# Patient Record
Sex: MALE | Race: WHITE
[De-identification: names, ages, dates, MRNs, and addresses within clinical notes are randomized per-mention and may not be internally consistent; named-entity substitution may affect disease eponyms.]

---

## 2019-07-25 ENCOUNTER — HOSPITAL ENCOUNTER (INPATIENT)
Dept: HOSPITAL 84 - D.ER | Age: 64
LOS: 4 days | Discharge: HOME HEALTH | DRG: 494 | End: 2019-07-29
Attending: ORTHOPAEDIC SURGERY | Admitting: ORTHOPAEDIC SURGERY
Payer: COMMERCIAL

## 2019-07-25 VITALS — BODY MASS INDEX: 31.51 KG/M2 | WEIGHT: 245.52 LBS | HEIGHT: 74 IN
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
## 2019-07-25 DIAGNOSIS — S82.251B: Primary | ICD-10-CM

## 2019-07-25 DIAGNOSIS — I10: ICD-10-CM

## 2019-07-25 LAB
ALBUMIN SERPL-MCNC: 3.5 G/DL (ref 3.4–5)
ALP SERPL-CCNC: 52 U/L (ref 46–116)
ALT SERPL-CCNC: 29 U/L (ref 10–68)
ANION GAP SERPL CALC-SCNC: 11.8 MMOL/L (ref 8–16)
APTT BLD: 27.2 SECONDS (ref 22.8–39.4)
BASOPHILS NFR BLD AUTO: 0.4 % (ref 0–2)
BILIRUB SERPL-MCNC: 0.28 MG/DL (ref 0.2–1.3)
BUN SERPL-MCNC: 25 MG/DL (ref 7–18)
CALCIUM SERPL-MCNC: 8.5 MG/DL (ref 8.5–10.1)
CHLORIDE SERPL-SCNC: 106 MMOL/L (ref 98–107)
CO2 SERPL-SCNC: 28.3 MMOL/L (ref 21–32)
CREAT SERPL-MCNC: 1.2 MG/DL (ref 0.6–1.3)
EOSINOPHIL NFR BLD: 2.6 % (ref 0–7)
ERYTHROCYTE [DISTWIDTH] IN BLOOD BY AUTOMATED COUNT: 14.1 % (ref 11.5–14.5)
GLOBULIN SER-MCNC: 3.2 G/L
GLUCOSE SERPL-MCNC: 127 MG/DL (ref 74–106)
HCT VFR BLD CALC: 41.6 % (ref 42–54)
HGB BLD-MCNC: 14.3 G/DL (ref 13.5–17.5)
IMM GRANULOCYTES NFR BLD: 0.4 % (ref 0–5)
INR PPP: 1 (ref 0.85–1.17)
LYMPHOCYTES NFR BLD AUTO: 19.6 % (ref 15–50)
MCH RBC QN AUTO: 30.9 PG (ref 26–34)
MCHC RBC AUTO-ENTMCNC: 34.4 G/DL (ref 31–37)
MCV RBC: 89.8 FL (ref 80–100)
MONOCYTES NFR BLD: 8.2 % (ref 2–11)
NEUTROPHILS NFR BLD AUTO: 68.8 % (ref 40–80)
OSMOLALITY SERPL CALC.SUM OF ELEC: 288 MOSM/KG (ref 275–300)
PLATELET # BLD: 198 10X3/UL (ref 130–400)
PMV BLD AUTO: 8.8 FL (ref 7.4–10.4)
POTASSIUM SERPL-SCNC: 4.1 MMOL/L (ref 3.5–5.1)
PROT SERPL-MCNC: 6.7 G/DL (ref 6.4–8.2)
PROTHROMBIN TIME: 12.7 SECONDS (ref 11.6–15)
RBC # BLD AUTO: 4.63 10X6/UL (ref 4.2–6.1)
SODIUM SERPL-SCNC: 142 MMOL/L (ref 136–145)
WBC # BLD AUTO: 9 10X3/UL (ref 4.8–10.8)

## 2019-07-26 VITALS — SYSTOLIC BLOOD PRESSURE: 126 MMHG | DIASTOLIC BLOOD PRESSURE: 71 MMHG

## 2019-07-26 VITALS — DIASTOLIC BLOOD PRESSURE: 59 MMHG | SYSTOLIC BLOOD PRESSURE: 110 MMHG

## 2019-07-26 VITALS — DIASTOLIC BLOOD PRESSURE: 78 MMHG | SYSTOLIC BLOOD PRESSURE: 136 MMHG

## 2019-07-26 VITALS — DIASTOLIC BLOOD PRESSURE: 67 MMHG | SYSTOLIC BLOOD PRESSURE: 127 MMHG

## 2019-07-26 VITALS — SYSTOLIC BLOOD PRESSURE: 117 MMHG | DIASTOLIC BLOOD PRESSURE: 80 MMHG

## 2019-07-26 VITALS — SYSTOLIC BLOOD PRESSURE: 112 MMHG | DIASTOLIC BLOOD PRESSURE: 52 MMHG

## 2019-07-26 VITALS — SYSTOLIC BLOOD PRESSURE: 109 MMHG | DIASTOLIC BLOOD PRESSURE: 63 MMHG

## 2019-07-26 VITALS — DIASTOLIC BLOOD PRESSURE: 64 MMHG | SYSTOLIC BLOOD PRESSURE: 114 MMHG

## 2019-07-26 VITALS — SYSTOLIC BLOOD PRESSURE: 108 MMHG | DIASTOLIC BLOOD PRESSURE: 67 MMHG

## 2019-07-26 VITALS — DIASTOLIC BLOOD PRESSURE: 66 MMHG | SYSTOLIC BLOOD PRESSURE: 109 MMHG

## 2019-07-26 VITALS — SYSTOLIC BLOOD PRESSURE: 132 MMHG | DIASTOLIC BLOOD PRESSURE: 73 MMHG

## 2019-07-26 VITALS — SYSTOLIC BLOOD PRESSURE: 110 MMHG | DIASTOLIC BLOOD PRESSURE: 59 MMHG

## 2019-07-26 VITALS — SYSTOLIC BLOOD PRESSURE: 116 MMHG | DIASTOLIC BLOOD PRESSURE: 76 MMHG

## 2019-07-26 VITALS — SYSTOLIC BLOOD PRESSURE: 114 MMHG | DIASTOLIC BLOOD PRESSURE: 72 MMHG

## 2019-07-26 LAB
APPEARANCE UR: CLEAR
BILIRUB SERPL-MCNC: NEGATIVE MG/DL
COLOR UR: YELLOW
GLUCOSE SERPL-MCNC: NEGATIVE MG/DL
KETONES UR STRIP-MCNC: (no result) MG/DL
NITRITE UR-MCNC: NEGATIVE MG/ML
PH UR STRIP: 5 [PH] (ref 5–6)
PROT UR-MCNC: NEGATIVE MG/DL
SP GR UR STRIP: 1.01 (ref 1–1.02)
UROBILINOGEN UR-MCNC: NORMAL MG/DL

## 2019-07-26 PROCEDURE — 0QHG06Z INSERTION OF INTRAMEDULLARY INTERNAL FIXATION DEVICE INTO RIGHT TIBIA, OPEN APPROACH: ICD-10-PCS | Performed by: ORTHOPAEDIC SURGERY

## 2019-07-27 VITALS — DIASTOLIC BLOOD PRESSURE: 67 MMHG | SYSTOLIC BLOOD PRESSURE: 128 MMHG

## 2019-07-27 VITALS — SYSTOLIC BLOOD PRESSURE: 122 MMHG | DIASTOLIC BLOOD PRESSURE: 55 MMHG

## 2019-07-27 VITALS — DIASTOLIC BLOOD PRESSURE: 66 MMHG | SYSTOLIC BLOOD PRESSURE: 111 MMHG

## 2019-07-27 VITALS — SYSTOLIC BLOOD PRESSURE: 108 MMHG | DIASTOLIC BLOOD PRESSURE: 62 MMHG

## 2019-07-27 VITALS — DIASTOLIC BLOOD PRESSURE: 66 MMHG | SYSTOLIC BLOOD PRESSURE: 141 MMHG

## 2019-07-27 LAB
ANION GAP SERPL CALC-SCNC: 11.2 MMOL/L (ref 8–16)
BASOPHILS NFR BLD AUTO: 0.2 % (ref 0–2)
BUN SERPL-MCNC: 14 MG/DL (ref 7–18)
CALCIUM SERPL-MCNC: 7.5 MG/DL (ref 8.5–10.1)
CHLORIDE SERPL-SCNC: 105 MMOL/L (ref 98–107)
CO2 SERPL-SCNC: 26.8 MMOL/L (ref 21–32)
CREAT SERPL-MCNC: 1 MG/DL (ref 0.6–1.3)
EOSINOPHIL NFR BLD: 0.8 % (ref 0–7)
ERYTHROCYTE [DISTWIDTH] IN BLOOD BY AUTOMATED COUNT: 14.2 % (ref 11.5–14.5)
GLUCOSE SERPL-MCNC: 121 MG/DL (ref 74–106)
HCT VFR BLD CALC: 35.9 % (ref 42–54)
HGB BLD-MCNC: 12.1 G/DL (ref 13.5–17.5)
IMM GRANULOCYTES NFR BLD: 0.3 % (ref 0–5)
LYMPHOCYTES NFR BLD AUTO: 12.7 % (ref 15–50)
MCH RBC QN AUTO: 30.9 PG (ref 26–34)
MCHC RBC AUTO-ENTMCNC: 33.7 G/DL (ref 31–37)
MCV RBC: 91.6 FL (ref 80–100)
MONOCYTES NFR BLD: 11.8 % (ref 2–11)
NEUTROPHILS NFR BLD AUTO: 74.2 % (ref 40–80)
OSMOLALITY SERPL CALC.SUM OF ELEC: 279 MOSM/KG (ref 275–300)
PLATELET # BLD: 196 10X3/UL (ref 130–400)
PMV BLD AUTO: 9.3 FL (ref 7.4–10.4)
POTASSIUM SERPL-SCNC: 4 MMOL/L (ref 3.5–5.1)
RBC # BLD AUTO: 3.92 10X6/UL (ref 4.2–6.1)
SODIUM SERPL-SCNC: 139 MMOL/L (ref 136–145)
WBC # BLD AUTO: 10.5 10X3/UL (ref 4.8–10.8)

## 2019-07-28 VITALS — DIASTOLIC BLOOD PRESSURE: 68 MMHG | SYSTOLIC BLOOD PRESSURE: 112 MMHG

## 2019-07-28 VITALS — DIASTOLIC BLOOD PRESSURE: 69 MMHG | SYSTOLIC BLOOD PRESSURE: 129 MMHG

## 2019-07-28 VITALS — DIASTOLIC BLOOD PRESSURE: 67 MMHG | SYSTOLIC BLOOD PRESSURE: 126 MMHG

## 2019-07-28 VITALS — DIASTOLIC BLOOD PRESSURE: 64 MMHG | SYSTOLIC BLOOD PRESSURE: 107 MMHG

## 2019-07-28 VITALS — DIASTOLIC BLOOD PRESSURE: 59 MMHG | SYSTOLIC BLOOD PRESSURE: 130 MMHG

## 2019-07-28 VITALS — SYSTOLIC BLOOD PRESSURE: 118 MMHG | DIASTOLIC BLOOD PRESSURE: 65 MMHG

## 2019-07-28 LAB
BASOPHILS NFR BLD AUTO: 0.2 % (ref 0–2)
EOSINOPHIL NFR BLD: 2.3 % (ref 0–7)
ERYTHROCYTE [DISTWIDTH] IN BLOOD BY AUTOMATED COUNT: 13.9 % (ref 11.5–14.5)
HCT VFR BLD CALC: 35.4 % (ref 42–54)
HGB BLD-MCNC: 12.1 G/DL (ref 13.5–17.5)
IMM GRANULOCYTES NFR BLD: 0.3 % (ref 0–5)
LYMPHOCYTES NFR BLD AUTO: 18.6 % (ref 15–50)
MCH RBC QN AUTO: 30.7 PG (ref 26–34)
MCHC RBC AUTO-ENTMCNC: 34.2 G/DL (ref 31–37)
MCV RBC: 89.8 FL (ref 80–100)
MONOCYTES NFR BLD: 10.9 % (ref 2–11)
NEUTROPHILS NFR BLD AUTO: 67.7 % (ref 40–80)
PLATELET # BLD: 205 10X3/UL (ref 130–400)
PMV BLD AUTO: 9.4 FL (ref 7.4–10.4)
RBC # BLD AUTO: 3.94 10X6/UL (ref 4.2–6.1)
WBC # BLD AUTO: 8.8 10X3/UL (ref 4.8–10.8)

## 2019-07-29 VITALS — DIASTOLIC BLOOD PRESSURE: 69 MMHG | SYSTOLIC BLOOD PRESSURE: 135 MMHG

## 2019-07-29 VITALS — DIASTOLIC BLOOD PRESSURE: 67 MMHG | SYSTOLIC BLOOD PRESSURE: 132 MMHG

## 2019-07-29 VITALS — DIASTOLIC BLOOD PRESSURE: 71 MMHG | SYSTOLIC BLOOD PRESSURE: 140 MMHG

## 2019-07-29 VITALS — DIASTOLIC BLOOD PRESSURE: 74 MMHG | SYSTOLIC BLOOD PRESSURE: 136 MMHG

## 2019-07-29 LAB
BASOPHILS NFR BLD AUTO: 0.5 % (ref 0–2)
EOSINOPHIL NFR BLD: 2.6 % (ref 0–7)
ERYTHROCYTE [DISTWIDTH] IN BLOOD BY AUTOMATED COUNT: 13.9 % (ref 11.5–14.5)
HCT VFR BLD CALC: 36.4 % (ref 42–54)
HGB BLD-MCNC: 12.2 G/DL (ref 13.5–17.5)
IMM GRANULOCYTES NFR BLD: 0.4 % (ref 0–5)
LYMPHOCYTES NFR BLD AUTO: 19.7 % (ref 15–50)
MCH RBC QN AUTO: 30 PG (ref 26–34)
MCHC RBC AUTO-ENTMCNC: 33.5 G/DL (ref 31–37)
MCV RBC: 89.4 FL (ref 80–100)
MONOCYTES NFR BLD: 10.7 % (ref 2–11)
NEUTROPHILS NFR BLD AUTO: 66.1 % (ref 40–80)
PLATELET # BLD: 231 10X3/UL (ref 130–400)
PMV BLD AUTO: 9.3 FL (ref 7.4–10.4)
RBC # BLD AUTO: 4.07 10X6/UL (ref 4.2–6.1)
WBC # BLD AUTO: 8.5 10X3/UL (ref 4.8–10.8)

## 2019-07-29 NOTE — MORECARE
CASE MANAGEMENT DISCHARGE SUMMARY
 
 
PATIENT: EDUARDO PARKER                      UNIT: [id number]
ACCOUNT#: [id number]                       ADM DATE: 19
AGE: 63     : 55  SEX: M            ROOM/BED: D.2211    
AUTHOR: MARRY LIZARRAGA                             PHYSICIAN:                               
 
REFERRING PHYSICIAN: HYUN PARKINSON MD         
DATE OF SERVICE: 19
Discharge Plan
 
 
Patient Name: EDUARDO PARKER
Facility: [facility]
Encounter #: [id number]
Medical Record #: [id number]
: 1955
Planned Disposition: Home Health Service
Anticipated Discharge Date: 
 
Discharge Date: 
Expected LOS: 
Initial Reviewer: [email or web address]
Initial Review Date: 2019
Generated: 19  12:48 pm 
Comments
 
DCP- Discharge Planning
 
Updated by [email or web address]: Radhika Pepe on 19  10:44 am CT
Patient Name: EDUARDO PARKER                                     
Admission Status: ER   
Accout number: [id number]                              
Admission Date: 2019   
: 1955                                                        
Admission Diagnosis:DISPL COMMNT FX SHAFT OF R TIBIA, INIT FOR OPN FX TYPE   
Attending: HYUN PARKINSON                                                
Current LOS:  4   
  
Anticipated DC Date:    
Planned Disposition: Home Health Service   
Primary Insurance: [employer]   
  
  
Discharge Planning Comments:   
  
CM met with patient to complete initial dc planning assessment.  CM educated 
patient on the CM role and verbal consent given by patient to complete 
assessment.   Patient lives at home with his wife where he is independent 
 
with his care.  At discharge patient plans to return home and feels this is a 
safe discharge.  CM discussed availability of home health, rehab services, 
and medical equipment. His wife was buying him a walker and he will have home 
health, WAN with [employer] Patient denied known discharge needs at 
this time. Sandra (patient's wife ) will be driving the patient home today. CM 
will continue to follow and will assist as needed with dc plans/needs.    
  
  
  
  
: Rahdika Pepe
 DCPIA - Discharge Planning Initial Assessment
 
Updated by [email or web address]: Radhika Pepe on 19  11:43 am
*  Is the patient Alert and Oriented?
Yes
*  How many steps to enter\exit or inside your home?
 
*  PCP
[facility]
*  Pharmacy
[facility] [address]
*  Preadmission Environment
Home with Family
*  ADLs
Independent
*  Equipment
Rolling Walker
*  List name and contact numbers for known caregivers / representatives who 
currently or will assist patient after discharge:
MARSHA (WIFE) [phone]
*  Verbal permission to speak to the caregivers and representatives has been 
obtained from the patient.
N/A
*  Community resources currently utilized
None
*  Additional services required to return to the preadmission environment?
Yes
*  Can the patient safely return to the preadmission environment?
Yes
*  Has this patient been hospitalized within the prior 30 days at any 
hospital?
No
 
 
 
 
 
 
 
Last DP export: 19  10:41 a
Patient Name: EDUARDO PARKER
 
Encounter #: [id number]
Page [id number]
 
 
 
 
 
Electronically Signed by MARRY LIZARRAGA on 19 at 1148
 
 
 
 
 
 
**All edits/amendments must be made on the electronic document**
 
DICTATION DATE: 19     : LONG  19     
RPT#: [id number]                                DC DATE:        
                                               STATUS: ADM IN  
[facility]
 [place] [address]
***END OF REPORT***

## 2019-07-29 NOTE — MORECARE
CASE MANAGEMENT DISCHARGE SUMMARY
 
 
PATIENT: EDUARDO PARKER                      UNIT: [id number]
ACCOUNT#: [id number]                       ADM DATE: 19
AGE: 63     : 55  SEX: M            ROOM/BED: D.2211    
AUTHOR: MARRY LIZARRAGA                             PHYSICIAN:                               
 
REFERRING PHYSICIAN: HYUN PARKINSON MD         
DATE OF SERVICE: 19
Discharge Plan
 
 
Patient Name: EDUARDO PARKER
[facility]
Encounter #: [id number]
Medical Record #: [id number]
: 1955
Planned Disposition: Home Health Service
Anticipated Discharge Date: 
 
Discharge Date: 
Expected LOS: 
Initial Reviewer: [id number]
Initial Review Date: 2019
Generated: 19  12:41 pm 
  
 
 
 
 
 
 
 
Patient Name: EDUARDO PARKER
 
Encounter #: [id number]
Page [id number]
 
 
 
 
 
Electronically Signed by MARRY LIZARRAGA on 19 at 1141
 
 
 
 
 
 
**All edits/amendments must be made on the electronic document**
 
DICTATION DATE: 19 1141     : LONG  19 1141     
RPT#: [id number]                                DC DATE:        
                                               STATUS: ADM IN  
[facility]
[address] [place] [address]
***END OF REPORT***

## 2019-07-29 NOTE — MORECARE
CASE MANAGEMENT DISCHARGE SUMMARY
 
 
PATIENT: EDUARDO PARKER                      UNIT: [id number]
ACCOUNT#: [id number]                       ADM DATE: 19
AGE: 63     : 55  SEX: M            ROOM/BED: D.2211    
AUTHOR: MARRY LIZARRAGA                             PHYSICIAN:                               
 
REFERRING PHYSICIAN: HYUN PARKINSON MD         
DATE OF SERVICE: 19
Discharge Plan
 
 
Patient Name: EDUARDO PARKER
Facility: [facility]:[place]
Encounter #: [id number]
Medical Record #: [id number]
: 1955
Planned Disposition: Home Health Service
Anticipated Discharge Date: 
 
Discharge Date: 
Expected LOS: 
Initial Reviewer: [id number]
Initial Review Date: 2019
Generated: 19  12:55 pm 
Comments
 
DCP- Discharge Planning
 
Updated by [email or web address]: Radhika Pepe on 19  10:44 am CT
Patient Name: EDUARDO PARKER                                     
Admission Status: ER   
Accout number: [id number]                              
Admission Date: 2019   
: 1955                                                        
Admission Diagnosis:DISPL COMMNT FX SHAFT OF R TIBIA, INIT FOR OPN FX TYPE   
Attending: HYUN PARKINSON                                                
Current LOS:  4   
  
Anticipated DC Date:    
Planned Disposition: Home Health Service   
Primary Insurance: [employer]   
  
  
Discharge Planning Comments:   
  
CM met with patient to complete initial dc planning assessment.  CM educated 
patient on the CM role and verbal consent given by patient to complete 
assessment.   Patient lives at home with his wife where he is independent 
 
with his care.  At discharge patient plans to return home and feels this is a 
safe discharge.  CM discussed availability of home health, rehab services, 
and medical equipment. His wife was buying him a walker and he will have home 
health, MEGAN with [employer]. Patient denied known discharge needs at 
this time. Sandra (patient's wife ) will be driving the patient home today. CM 
will continue to follow and will assist as needed with dc plans/needs.    
  
  
  
  
: Radhika Pepe
 DCPIA - Discharge Planning Initial Assessment
 
Updated by [email or web address]: Radhika Pepe on 19  11:43 am
*  Is the patient Alert and Oriented?
Yes
*  How many steps to enter\exit or inside your home?
 
*  PCP
[facility]
*  Pharmacy
[facility] [address]
*  Preadmission Environment
Home with Family
*  ADLs
Independent
*  Equipment
Rolling Walker
*  List name and contact numbers for known caregivers / representatives who 
currently or will assist patient after discharge:
MARSHA (WIFE) [phone]
*  Verbal permission to speak to the caregivers and representatives has been 
obtained from the patient.
N/A
*  Community resources currently utilized
None
*  Additional services required to return to the preadmission environment?
Yes
*  Can the patient safely return to the preadmission environment?
Yes
*  Has this patient been hospitalized within the prior 30 days at any 
hospital?
No
 
External Providers
[facility]
 
Next Contact Date: 
Service Request Date: 
Service Type: 
Resolution: 
 
 
Reviewer: 
Comments: 
 
 
 
 
Coverage Notice
 
Reviewer: [email or web address] iNssa Pepe
 
Notice Issued Date-Time: 2019  11:30
Notice Type: Patient Choice Letter
 
Notice Delivered To: Patient
Relationship to Patient: 
Representative Name: 
 
Delivery Method: HAND - Hand Delivered
Gege Days:
Prior Verbal Notification: 
 
Recipient Understood Notice: Yes
Recipient Signature: Yes
Med Rec Note Co-signed by Attending:
 
Coverage Notice Comment:  [facility]
 
Last DP export: 19  10:48 a
Patient Name: EDUARDO PARKER
Encounter #: [id number]
Page [phone]
 
 
 
 
 
Electronically Signed by MARRY LIZARRAGA on 19 at 1155
 
 
 
 
 
 
**All edits/amendments must be made on the electronic document**
 
DICTATION DATE: 19 115     : LONG  19 1154     
RPT#: [id number]                                DC DATE:        
                                               STATUS: ADM IN  
[facility]
 [place] [address]
***END OF REPORT***

## 2019-08-06 ENCOUNTER — HOSPITAL ENCOUNTER (OUTPATIENT)
Dept: HOSPITAL 84 - D.LABREF | Age: 64
Discharge: HOME | End: 2019-08-06
Attending: ORTHOPAEDIC SURGERY
Payer: COMMERCIAL

## 2019-08-06 ENCOUNTER — HOSPITAL ENCOUNTER (INPATIENT)
Dept: HOSPITAL 84 - D.MS | Age: 64
LOS: 3 days | Discharge: HOME HEALTH | DRG: 581 | End: 2019-08-09
Attending: ORTHOPAEDIC SURGERY | Admitting: ORTHOPAEDIC SURGERY
Payer: COMMERCIAL

## 2019-08-06 VITALS — SYSTOLIC BLOOD PRESSURE: 105 MMHG | DIASTOLIC BLOOD PRESSURE: 49 MMHG

## 2019-08-06 VITALS
HEIGHT: 73 IN | BODY MASS INDEX: 31.81 KG/M2 | WEIGHT: 240 LBS | HEIGHT: 73 IN | WEIGHT: 240 LBS | BODY MASS INDEX: 31.81 KG/M2 | BODY MASS INDEX: 31.81 KG/M2

## 2019-08-06 VITALS — DIASTOLIC BLOOD PRESSURE: 72 MMHG | SYSTOLIC BLOOD PRESSURE: 132 MMHG

## 2019-08-06 VITALS — BODY MASS INDEX: 31.7 KG/M2

## 2019-08-06 DIAGNOSIS — S81.831A: Primary | ICD-10-CM

## 2019-08-06 DIAGNOSIS — K21.9: ICD-10-CM

## 2019-08-06 DIAGNOSIS — E66.9: ICD-10-CM

## 2019-08-06 DIAGNOSIS — S81.801A: ICD-10-CM

## 2019-08-06 DIAGNOSIS — I10: ICD-10-CM

## 2019-08-06 DIAGNOSIS — B96.89: ICD-10-CM

## 2019-08-06 DIAGNOSIS — S80.11XA: ICD-10-CM

## 2019-08-06 DIAGNOSIS — S81.801A: Primary | ICD-10-CM

## 2019-08-06 NOTE — NUR
RECEIVED PATIENT TO ROOM 2203 VIA WC FROM DR. PARKINSON OFFICE. A/O X3.
INCISION TO RIGHT KNEE IS CLEAN AND DRY. WOUND TO RIGHT LOWER LEG IS CLOSED
BUT HAS SOME CLIPS AND SUTURES IN PLACE. APPROXIMATLY 2 INCHES LONG. IV SITED
TO RIGHT FOREARM AFTER 2 ATTEMPTS WITH 22G. WIFE AT BEDSIDE. DENIES NEEDS OF
PAIN AT THIS TIME.

## 2019-08-07 VITALS — SYSTOLIC BLOOD PRESSURE: 109 MMHG | DIASTOLIC BLOOD PRESSURE: 54 MMHG

## 2019-08-07 VITALS — SYSTOLIC BLOOD PRESSURE: 126 MMHG | DIASTOLIC BLOOD PRESSURE: 67 MMHG

## 2019-08-07 VITALS — SYSTOLIC BLOOD PRESSURE: 122 MMHG | DIASTOLIC BLOOD PRESSURE: 62 MMHG

## 2019-08-07 VITALS — SYSTOLIC BLOOD PRESSURE: 150 MMHG | DIASTOLIC BLOOD PRESSURE: 69 MMHG

## 2019-08-07 VITALS — DIASTOLIC BLOOD PRESSURE: 58 MMHG | SYSTOLIC BLOOD PRESSURE: 103 MMHG

## 2019-08-07 VITALS — DIASTOLIC BLOOD PRESSURE: 69 MMHG | SYSTOLIC BLOOD PRESSURE: 150 MMHG

## 2019-08-07 LAB
ALBUMIN SERPL-MCNC: 3.2 G/DL (ref 3.4–5)
ALP SERPL-CCNC: 71 U/L (ref 46–116)
ALT SERPL-CCNC: 24 U/L (ref 10–68)
ANION GAP SERPL CALC-SCNC: 11.9 MMOL/L (ref 8–16)
BILIRUB SERPL-MCNC: 0.59 MG/DL (ref 0.2–1.3)
BUN SERPL-MCNC: 17 MG/DL (ref 7–18)
CALCIUM SERPL-MCNC: 8.3 MG/DL (ref 8.5–10.1)
CHLORIDE SERPL-SCNC: 104 MMOL/L (ref 98–107)
CO2 SERPL-SCNC: 27.2 MMOL/L (ref 21–32)
CREAT SERPL-MCNC: 0.9 MG/DL (ref 0.6–1.3)
GLOBULIN SER-MCNC: 2.7 G/L
GLUCOSE SERPL-MCNC: 88 MG/DL (ref 74–106)
OSMOLALITY SERPL CALC.SUM OF ELEC: 278 MOSM/KG (ref 275–300)
POTASSIUM SERPL-SCNC: 4.1 MMOL/L (ref 3.5–5.1)
PROT SERPL-MCNC: 5.9 G/DL (ref 6.4–8.2)
SODIUM SERPL-SCNC: 139 MMOL/L (ref 136–145)

## 2019-08-07 PROCEDURE — 0JDN0ZZ EXTRACTION OF RIGHT LOWER LEG SUBCUTANEOUS TISSUE AND FASCIA, OPEN APPROACH: ICD-10-PCS | Performed by: ORTHOPAEDIC SURGERY

## 2019-08-07 PROCEDURE — 0Y9H0ZZ DRAINAGE OF RIGHT LOWER LEG, OPEN APPROACH: ICD-10-PCS | Performed by: ORTHOPAEDIC SURGERY

## 2019-08-07 NOTE — NUR
PT RESTING IN BED WATCHING TV.  VOICES UPCOMING SURGICAL PROCEDURE AND BEING
NPO SINCE MIDNIGHT.  NO ACUTE DISTRESS NOTED.  DENIES PAIN AT THIS TIME.  IV
TO RIGHT FOREARM WITH 1/2 NS @ 30 ML/HR INFUSING VIA PUMP.  SITE WITHOUT
REDNESS OR EDEMA.  HEALING INCISION TO RIGHT KNEE, DRESSING C/D/I TO RIGHT
LOWER LEG.  DENIES FURTHER NEEDS AT THIS TIME.  CL WITHIN REACH.  ENCOURAGED
TO CALL WITH NEEDS.  CONTINUE POC

## 2019-08-07 NOTE — NUR
RECEIVED PT FROM OR VIA STRETCHER. ALERT AND ORIENTED X4. C/O PAIN IN IN RLE
AS 10 . MEDICATED WITH MORPHINE AS ORDERED. ACE WRAP TO RLE WITH HEMOVAC DRAIN
WITH BLOODY DRAINAGE. PEDAL PULSES GOOD BILAT. NO EDEMA NOTED. IV INFUSING IN
LT HAND WITHOUT DIFF. V/S STABLE. SR ELEVATED X2. WIFE AT BEDSIDE. CL IN
REACH.

## 2019-08-08 VITALS — DIASTOLIC BLOOD PRESSURE: 54 MMHG | SYSTOLIC BLOOD PRESSURE: 108 MMHG

## 2019-08-08 VITALS — DIASTOLIC BLOOD PRESSURE: 66 MMHG | SYSTOLIC BLOOD PRESSURE: 136 MMHG

## 2019-08-08 VITALS — SYSTOLIC BLOOD PRESSURE: 95 MMHG | DIASTOLIC BLOOD PRESSURE: 43 MMHG

## 2019-08-08 VITALS — SYSTOLIC BLOOD PRESSURE: 127 MMHG | DIASTOLIC BLOOD PRESSURE: 82 MMHG

## 2019-08-08 VITALS — SYSTOLIC BLOOD PRESSURE: 114 MMHG | DIASTOLIC BLOOD PRESSURE: 56 MMHG

## 2019-08-08 VITALS — DIASTOLIC BLOOD PRESSURE: 56 MMHG | SYSTOLIC BLOOD PRESSURE: 119 MMHG

## 2019-08-08 LAB
ALBUMIN SERPL-MCNC: 2.8 G/DL (ref 3.4–5)
ALP SERPL-CCNC: 66 U/L (ref 46–116)
ALT SERPL-CCNC: 22 U/L (ref 10–68)
ANION GAP SERPL CALC-SCNC: 12.6 MMOL/L (ref 8–16)
BASOPHILS NFR BLD AUTO: 0.3 % (ref 0–2)
BILIRUB SERPL-MCNC: 0.36 MG/DL (ref 0.2–1.3)
BUN SERPL-MCNC: 20 MG/DL (ref 7–18)
CALCIUM SERPL-MCNC: 8.3 MG/DL (ref 8.5–10.1)
CHLORIDE SERPL-SCNC: 105 MMOL/L (ref 98–107)
CO2 SERPL-SCNC: 26.3 MMOL/L (ref 21–32)
CREAT SERPL-MCNC: 1.1 MG/DL (ref 0.6–1.3)
EOSINOPHIL NFR BLD: 0 % (ref 0–7)
ERYTHROCYTE [DISTWIDTH] IN BLOOD BY AUTOMATED COUNT: 13.4 % (ref 11.5–14.5)
GLOBULIN SER-MCNC: 3.2 G/L
GLUCOSE SERPL-MCNC: 118 MG/DL (ref 74–106)
HCT VFR BLD CALC: 36.4 % (ref 42–54)
HGB BLD-MCNC: 12.2 G/DL (ref 13.5–17.5)
IMM GRANULOCYTES NFR BLD: 0.5 % (ref 0–5)
LYMPHOCYTES NFR BLD AUTO: 13.2 % (ref 15–50)
MCH RBC QN AUTO: 30 PG (ref 26–34)
MCHC RBC AUTO-ENTMCNC: 33.5 G/DL (ref 31–37)
MCV RBC: 89.7 FL (ref 80–100)
MONOCYTES NFR BLD: 5.8 % (ref 2–11)
NEUTROPHILS NFR BLD AUTO: 80.2 % (ref 40–80)
OSMOLALITY SERPL CALC.SUM OF ELEC: 281 MOSM/KG (ref 275–300)
PLATELET # BLD: 297 10X3/UL (ref 130–400)
PMV BLD AUTO: 8.2 FL (ref 7.4–10.4)
POTASSIUM SERPL-SCNC: 4.9 MMOL/L (ref 3.5–5.1)
PROT SERPL-MCNC: 6 G/DL (ref 6.4–8.2)
RBC # BLD AUTO: 4.06 10X6/UL (ref 4.2–6.1)
SODIUM SERPL-SCNC: 139 MMOL/L (ref 136–145)
WBC # BLD AUTO: 7.7 10X3/UL (ref 4.8–10.8)

## 2019-08-08 NOTE — MORECARE
CASE MANAGEMENT DISCHARGE SUMMARY
 
 
PATIENT: EDUARDO PARKER                      UNIT: [id number]
ACCOUNT#: [id number]                       ADM DATE: 19
AGE: 63     : 55  SEX: M            ROOM/BED: D.2203    
AUTHOR: MARRY LIZARRAGA                             PHYSICIAN:                               
 
REFERRING PHYSICIAN: HYUN PARKINSON MD         
DATE OF SERVICE: 19
Discharge Plan
 
 
Patient Name: EDUARDO PARKER
Facility: [facility]:[place]
Encounter #: [id number]
Medical Record #: [id number]
: 1955
Planned Disposition: Home Health Service
Anticipated Discharge Date: 
 
Discharge Date: 
Expected LOS: 
Initial Reviewer: [id number]
Initial Review Date: 2019
Generated: 19   4:02 pm 
Comments
 
DCP- Discharge Planning
 
Updated by [email or web address]: Radhika Pepe on 19   1:55 pm CT
Patient Name: EDUARDO PARKER                                     
Admission Status: Urgent   
Accout number: [id number]                              
Admission Date: 2019   
: 1955                                                        
Admission Diagnosis:   
Attending: HYUN PARKINSON                                                
Current LOS:  2   
  
Anticipated DC Date:    
Planned Disposition: Home Health Service   
Primary Insurance: [employer] POS   
  
  
Discharge Planning Comments:   
  
CM met with patient to complete initial dc planning assessment.  CM educated 
patient on the CM role and verbal consent given by patient to complete 
assessment.   Patient lives at home with his wife where he was independent 
 
with his care.   At discharge patient plans to return home and feels this is 
a safe discharge.  CM discussed availability of home health, rehab services, 
and medical equipment. He is current with [facility]  ( MEGAN signed and 
in chart) He will need iv abx for 3 weeks. He does not care what company to 
use, just needs to take his insurance and the cheapest. Patient has a walker 
at home. He would like either crutches or a wheelchair, because he drags his 
foot. Will speak with MD to see what he wants him to have.  CM will continue 
to follow and will assist as needed with dc plans/needs.    
  
  
  
  
: Radhika Pepe
 DCPIA - Discharge Planning Initial Assessment
 
Updated by [id number]: Radhika Pepe on 19   2:51 pm
*  Is the patient Alert and Oriented?
Yes
*  How many steps to enter\exit or inside your home?
 
*  PCP
[facility]
*  [facility]
*  Preadmission Environment
Home with Family
*  ADLs
Independent
*  Equipment
Rolling Walker
*  List name and contact numbers for known caregivers / representatives who 
currently or will assist patient after discharge:
Bing Parker [phone]
*  Verbal permission to speak to the caregivers and representatives has been 
obtained from the patient.
Yes
*  Community resources currently utilized
Home Health
*  Please name any agencies selected above.
[facility]
*  Additional services required to return to the preadmission environment?
Yes
*  Can the patient safely return to the preadmission environment?
Yes
*  Has this patient been hospitalized within the prior 30 days at any 
hospital?
Yes
 
 
 
 
 
 
Coverage Notice
 
Reviewer: [email or web address] iNssa Pepe
 
Notice Issued Date-Time: 2019  14:20
Notice Type: Patient Choice Letter
 
Notice Delivered To: Patient
Relationship to Patient: 
Representative Name: 
 
Delivery Method: HAND - Hand Delivered
Gege Days:
Prior Verbal Notification: 
 
Recipient Understood Notice: Yes
Recipient Signature: Yes
Med Rec Note Co-signed by Attending:
 
Coverage Notice Comment:  [facility] (current)
 
Last DP export: 19   1:54 pm
Patient Name: EDUARDO PARKER
Encounter #: [id number]
Page [id number]
 
 
 
 
 
Electronically Signed by MARRY LIZARRAGA on 19 at 1503
 
 
 
 
 
 
**All edits/amendments must be made on the electronic document**
 
DICTATION DATE: 19 150     : LONG  19 150     
RPT#: [id number]                                DC DATE:        
                                               STATUS: ADM IN  
[facility]
[address] [place] [address]
***END OF REPORT***

## 2019-08-08 NOTE — NUR
ALERT REATING IN BED, ACE WRAP AND HEMIVAC DRAIN IN PLACE TO RIGHT LOWER LEG,
REQUSTING TYLENOL FOR PAIN, GIVEN, SEE SHIFT ASSESSMENT, CALL LIGHT IN REACH

## 2019-08-08 NOTE — NUR
RESTING QUIETLY WITH EYES CLOSED. NO DISTRESS. RESP EVEN AND NONLABORED.
REFUSED TO KEEP PLEXI BOOT ON EARLIER BECAUSE IT WAS HURTING. CL IN REACH.

## 2019-08-08 NOTE — MORECARE
CASE MANAGEMENT DISCHARGE SUMMARY
 
 
PATIENT: EDUARDO PARKER                      UNIT: [id number]
ACCOUNT#: [id number]                       ADM DATE: 19
AGE: 63     : 55  SEX: M            ROOM/BED: D.2203    
AUTHOR: MARRY LIZARRAGA                             PHYSICIAN:                               
 
REFERRING PHYSICIAN: HYUN PARKINSON MD         
DATE OF SERVICE: 19
Discharge Plan
 
 
Patient Name: EDUARDO PARKER
[facility]
Encounter #: [id number]
Medical Record #: [id number]
: 1955
Planned Disposition: Home Health Service
Anticipated Discharge Date: 
 
Discharge Date: 
Expected LOS: 
Initial Reviewer: [id number]
Initial Review Date: 2019
Generated: 19   3:54 pm 
 DCPIA - Discharge Planning Initial Assessment
 
Updated by [email or web address]: Radhika Pepe on 19   2:51 pm
*  Is the patient Alert and Oriented?
Yes
*  How many steps to enter\exit or inside your home?
 
*  PCP
[facility]
*  [facility] 
*  Preadmission Environment
Home with Family
*  ADLs
Independent
*  Equipment
Rolling Walker
*  List name and contact numbers for known caregivers / representatives who 
currently or will assist patient after discharge:
Bing Parker [phone]
*  Verbal permission to speak to the caregivers and representatives has been 
obtained from the patient.
Yes
*  Community resources currently utilized
 
Home Health
*  Please name any agencies selected above.
[facility]
*  Additional services required to return to the preadmission environment?
Yes
*  Can the patient safely return to the preadmission environment?
Yes
*  Has this patient been hospitalized within the prior 30 days at any 
hospital?
Yes
 
 
 
 
 
 
Patient Name: EDUARDO PARKER
Encounter #: [id number]
Page [id number]
 
 
 
 
 
Electronically Signed by MARRY LIZARRAGA on 19 at 1454
 
 
 
 
 
 
**All edits/amendments must be made on the electronic document**
 
DICTATION DATE: 19 1454     : LONG  19 1454     
RPT#: [id number]                                DC DATE:        
                                               STATUS: ADM IN  
[facility]
 [place] [address]
***END OF REPORT***

## 2019-08-09 VITALS — SYSTOLIC BLOOD PRESSURE: 106 MMHG | DIASTOLIC BLOOD PRESSURE: 58 MMHG

## 2019-08-09 VITALS — SYSTOLIC BLOOD PRESSURE: 110 MMHG | DIASTOLIC BLOOD PRESSURE: 60 MMHG

## 2019-08-09 VITALS — SYSTOLIC BLOOD PRESSURE: 119 MMHG | DIASTOLIC BLOOD PRESSURE: 70 MMHG

## 2019-08-09 VITALS — SYSTOLIC BLOOD PRESSURE: 103 MMHG | DIASTOLIC BLOOD PRESSURE: 48 MMHG

## 2019-08-09 VITALS — DIASTOLIC BLOOD PRESSURE: 59 MMHG | SYSTOLIC BLOOD PRESSURE: 109 MMHG

## 2019-08-09 LAB
ALBUMIN SERPL-MCNC: 2.9 G/DL (ref 3.4–5)
ALP SERPL-CCNC: 68 U/L (ref 46–116)
ALT SERPL-CCNC: 23 U/L (ref 10–68)
ANION GAP SERPL CALC-SCNC: 13.2 MMOL/L (ref 8–16)
BASOPHILS NFR BLD AUTO: 0.3 % (ref 0–2)
BILIRUB SERPL-MCNC: 0.3 MG/DL (ref 0.2–1.3)
BUN SERPL-MCNC: 17 MG/DL (ref 7–18)
CALCIUM SERPL-MCNC: 8.6 MG/DL (ref 8.5–10.1)
CHLORIDE SERPL-SCNC: 105 MMOL/L (ref 98–107)
CO2 SERPL-SCNC: 26.7 MMOL/L (ref 21–32)
CREAT SERPL-MCNC: 1.1 MG/DL (ref 0.6–1.3)
EOSINOPHIL NFR BLD: 2.2 % (ref 0–7)
ERYTHROCYTE [DISTWIDTH] IN BLOOD BY AUTOMATED COUNT: 13.9 % (ref 11.5–14.5)
GLOBULIN SER-MCNC: 3.2 G/L
GLUCOSE SERPL-MCNC: 103 MG/DL (ref 74–106)
HCT VFR BLD CALC: 36.7 % (ref 42–54)
HGB BLD-MCNC: 12.1 G/DL (ref 13.5–17.5)
IMM GRANULOCYTES NFR BLD: 0.6 % (ref 0–5)
LYMPHOCYTES NFR BLD AUTO: 32.6 % (ref 15–50)
MCH RBC QN AUTO: 30 PG (ref 26–34)
MCHC RBC AUTO-ENTMCNC: 33 G/DL (ref 31–37)
MCV RBC: 90.8 FL (ref 80–100)
MONOCYTES NFR BLD: 9.3 % (ref 2–11)
NEUTROPHILS NFR BLD AUTO: 55 % (ref 40–80)
OSMOLALITY SERPL CALC.SUM OF ELEC: 282 MOSM/KG (ref 275–300)
PLATELET # BLD: 316 10X3/UL (ref 130–400)
PMV BLD AUTO: 8.7 FL (ref 7.4–10.4)
POTASSIUM SERPL-SCNC: 3.9 MMOL/L (ref 3.5–5.1)
PROT SERPL-MCNC: 6.1 G/DL (ref 6.4–8.2)
RBC # BLD AUTO: 4.04 10X6/UL (ref 4.2–6.1)
SODIUM SERPL-SCNC: 141 MMOL/L (ref 136–145)
WBC # BLD AUTO: 6.9 10X3/UL (ref 4.8–10.8)

## 2019-08-09 NOTE — NUR
DISCHARGED TO HOME AMBULATORY WITH WIFE. DISCHARGE INSTRUCTIONS GIVEN BOTH
VERBALLY AND WRITTEN. ALL QUESTIONS ANSWERED. PATIENT AND WIFE VERBALZED
UNDERSTANDING OF SAME. NEEDED PRESCRIPTIONS GIVEN TO PATIENT. SL TO LEFT HAND
D/C WITH CATHETER INTACT. ALL BELONGINGS WITH PATIENT.

## 2019-08-09 NOTE — MORECARE
CASE MANAGEMENT DISCHARGE SUMMARY
 
 
PATIENT: EDUARDO PARKER                      UNIT: [id number]
ACCOUNT#: [id number]                       ADM DATE: 19
AGE: 63     : 55  SEX: M            ROOM/BED: D.2203    
AUTHOR: MARRY LIZARRAGA                             PHYSICIAN:                               
 
REFERRING PHYSICIAN: HYUN PARKINSON MD         
DATE OF SERVICE: 19
Discharge Plan
 
 
Patient Name: EDUARDO PARKER
Facility: [facility]:[place]
Encounter #: [id number]
Medical Record #: [id number]
: 1955
Planned Disposition: Home Health Service
Anticipated Discharge Date: 
 
Discharge Date: 
Expected LOS: 
Initial Reviewer: [id number]
Initial Review Date: 2019
Generated: 19   4:47 pm 
Comments
 
DCP- Discharge Planning
 
Updated by [email or web address]: Radhika Pepe on 19   1:36 pm CT
PATIENT DISCHARGING HOME TODAY WITH ORAL ABX, HE WILL CONTINUE WITH [employer]. CRUTCHES ARE ORDERED FROM [facility] AND THEY WILL 
BE DELIVERED TO THE HOSPITAL. WIFE AT BEDSIDE  AND WILL BE DRIVING THEM HOME
DCP- Discharge Planning
 
Updated by [id number]: Radhika Pepe on 19   1:55 pm CT
Patient Name: EDUARDO PARKER                                     
Admission Status: Urgent   
Accout number: [id number]                              
Admission Date: 2019   
: 1955                                                        
Admission Diagnosis:   
Attending: HYUN PARKINSON                                                
Current LOS:  2   
  
Anticipated DC Date:    
Planned Disposition: Home Health Service   
Primary Insurance: [employer] [facility] [place] [place] [employer]   
  
 
  
Discharge Planning Comments:   
  
CM met with patient to complete initial dc planning assessment.  CM educated 
patient on the CM role and verbal consent given by patient to complete 
assessment.   Patient lives at home with his wife where he was independent 
with his care.   At discharge patient plans to return home and feels this is 
a safe discharge.  CM discussed availability of home health, rehab services, 
and medical equipment. He is current with [employer]  ( MEGAN signed and 
in chart) He will need iv abx for 3 weeks. He does not care what company to 
use, just needs to take his insurance and the cheapest. Patient has a walker 
at home. He would like either crutches or a wheelchair, because he drags his 
foot. Will speak with MD to see what he wants him to have.  CM will continue 
to follow and will assist as needed with dc plans/needs.    
  
  
  
  
: Radhika Pepe
 DCPIA - Discharge Planning Initial Assessment
 
Updated by [email or web address]: Radhika Pepe on 19   2:51 pm
*  Is the patient Alert and Oriented?
Yes
*  How many steps to enter\exit or inside your home?
 
*  PCP
[facility]
*  [facility]
*  Preadmission Environment
Home with Family
*  ADLs
Independent
*  Equipment
Rolling Walker
*  List name and contact numbers for known caregivers / representatives who 
currently or will assist patient after discharge:
Bing Parker [phone]
*  Verbal permission to speak to the caregivers and representatives has been 
obtained from the patient.
Yes
*  Community resources currently utilized
Home Health
*  Please name any agencies selected above.
[facility]
*  Additional services required to return to the preadmission environment?
Yes
*  Can the patient safely return to the preadmission environment?
Yes
*  Has this patient been hospitalized within the prior 30 days at any 
hospital?
 
Yes
 
 
 
 
 
Coverage Notice
 
Reviewer: [id number] - Radhika Pepe
 
Notice Issued Date-Time: 2019  14:20
Notice Type: Patient Choice Letter
 
Notice Delivered To: Patient
Relationship to Patient: 
Representative Name: 
 
Delivery Method: HAND - Hand Delivered
Gege Days:
Prior Verbal Notification: 
 
Recipient Understood Notice: Yes
Recipient Signature: Yes
Med Rec Note Co-signed by Attending:
 
Coverage Notice Comment:  [facility]egan [facility] (current)
 
Last DP export: 19   1:43 pm
Patient Name: EDUARDO PARKER
Encounter #: [id number]
Page [phone]
 
 
 
 
 
Electronically Signed by MARRY LIZARRAGA on 19 at 1548
 
 
 
 
 
 
**All edits/amendments must be made on the electronic document**
 
DICTATION DATE: 19 1547     : LONG  19 1547     
RPT#: [id number]                                DC DATE:        
                                               STATUS: ADM IN  
[facility]
[address] [place] [address]
***END OF REPORT***

## 2019-08-09 NOTE — MORECARE
CASE MANAGEMENT DISCHARGE SUMMARY
 
 
PATIENT: EDUARDO PARKER                      UNIT: [id number]
ACCOUNT#: [id number]                       ADM DATE: 19
AGE: 63     : 55  SEX: M            ROOM/BED: D.2203    
AUTHOR: MARRY LIZARRAGA                             PHYSICIAN:                               
 
REFERRING PHYSICIAN: HYUN PARKINSON MD         
DATE OF SERVICE: 19
Discharge Plan
 
 
Patient Name: EDUARDO PARKER
Facility: [facility]:[place]
Encounter #: [id number]
Medical Record #: [id number]
: 1955
Planned Disposition: Home Health Service
Anticipated Discharge Date: 
 
Discharge Date: 
Expected LOS: 
Initial Reviewer: [id number]
Initial Review Date: 2019
Generated: 19   3:42 pm 
Comments
 
DCP- Discharge Planning
 
Updated by [id number]: Radhika Pepe on 19   1:36 pm CT
PATIENT DISCHARGING HOME TODAY WITH ORAL ABX, HE WILL CONTINUE WITH [facility]. CRUTCHES ARE ORDERED FROM [facility] AND THEY WILL 
BE DELIVERED TO THE HOSPITAL. WIFE AT BEDSIDE  AND WILL BE DRIVING THEM HOME
DCP- Discharge Planning
 
Updated by [email or web address]: Radhika Pepe on 19   1:55 pm CT
Patient Name: EDUARDO PARKER                                     
Admission Status: Urgent   
Accout number: [id number]                              
Admission Date: 2019   
: 1955                                                        
Admission Diagnosis:   
Attending: HYUN PARKINSON                                                
Current LOS:  2   
  
Anticipated DC Date:    
Planned Disposition: Home Health Service   
Primary Insurance: [employer] [facility] POS   
  
 
  
Discharge Planning Comments:   
  
CM met with patient to complete initial dc planning assessment.  CM educated 
patient on the CM role and verbal consent given by patient to complete 
assessment.   Patient lives at home with his wife where he was independent 
with his care.   At discharge patient plans to return home and feels this is 
a safe discharge.  CM discussed availability of home health, rehab services, 
and medical equipment. He is current with [facility]  ( MEGAN signed and 
in chart) He will need iv abx for 3 weeks. He does not care what company to 
use, just needs to take his insurance and the cheapest. Patient has a walker 
at home. He would like either crutches or a wheelchair, because he drags his 
foot. Will speak with MD to see what he wants him to have.  CM will continue 
to follow and will assist as needed with dc plans/needs.    
  
  
  
  
: Radhika Pepe
 DCPIA - Discharge Planning Initial Assessment
 
Updated by [email or web address]: Radhika Pepe on 19   2:51 pm
*  Is the patient Alert and Oriented?
Yes
*  How many steps to enter\exit or inside your home?
 
*  PCP
[facility]
*  [facility] 
*  Preadmission Environment
Home with Family
*  ADLs
Independent
*  Equipment
Rolling Walker
*  List name and contact numbers for known caregivers / representatives who 
currently or will assist patient after discharge:
Bing Parker [phone]
*  Verbal permission to speak to the caregivers and representatives has been 
obtained from the patient.
Yes
*  Community resources currently utilized
Home Health
*  Please name any agencies selected above.
[facility]
*  Additional services required to return to the preadmission environment?
Yes
*  Can the patient safely return to the preadmission environment?
Yes
*  Has this patient been hospitalized within the prior 30 days at any 
hospital?
 
Yes
 
 
 
 
 
Coverage Notice
 
Reviewer: [email or web address] - Radhika Pepe
 
Notice Issued Date-Time: 2019  14:20
Notice Type: Patient Choice Letter
 
Notice Delivered To: Patient
Relationship to Patient: 
Representative Name: 
 
Delivery Method: HAND - Hand Delivered
Gege Days:
Prior Verbal Notification: 
 
Recipient Understood Notice: Yes
Recipient Signature: Yes
Med Rec Note Co-signed by Attending:
 
Coverage Notice Comment:  [facility] (current)
 
Last DP export: 19   1:34 pm
Patient Name: EDUARDO PARKER
Encounter #: [id number]
Page [id number]
 
 
 
 
 
Electronically Signed by MARRY LIZARRAGA on 19 at 1443
 
 
 
 
 
 
**All edits/amendments must be made on the electronic document**
 
DICTATION DATE: 19 144     : LONG  19 1442     
RPT#: [id number]                                DC DATE:        
                                               STATUS: ADM IN  
[facility]
[address] [place] [address]
***END OF REPORT***

## 2019-08-09 NOTE — NUR
AWAKE AND ALERT. ORIENTED X3. NO C/O AT THIS TIME. DR. PARKINSON'S NURSE
HERE AND D/C HEMIVAC AND CHANGED DRESSING TO RIGHT LE. INCISION IS CLEAN AND
DRY. SKIN IS INTACT WITHOUT REDNESS EXCEPT FOR INCISION. LUNGS ARE CLEAR
BILATERLLY, NO COUGH NOTED. SL TO LEFT HAND IS PATENT WITHOUT REDNESS AT
INSERTION SITE. NO BM YET BUT THINKS HE WILL GO SOON. WILL MONITOR. DENIES
NEEDS.

## 2019-08-09 NOTE — MORECARE
CASE MANAGEMENT DISCHARGE SUMMARY
 
 
PATIENT: EDUARDO PARKER                      UNIT: [id number]
ACCOUNT#: [id number]                       ADM DATE: 19
AGE: 63     : 55  SEX: M            ROOM/BED: D.2203    
AUTHOR: MARRY LIZARRAGA                             PHYSICIAN:                               
 
REFERRING PHYSICIAN: HYUN PARKINSON MD         
DATE OF SERVICE: 19
Discharge Plan
 
 
Patient Name: EDUARDO PARKER
Facility: [facility]:[place]
Encounter #: [id number]
Medical Record #: [id number]
: 1955
Planned Disposition: Home Health Service
Anticipated Discharge Date: 
 
Discharge Date: 
Expected LOS: 
Initial Reviewer: [id number]
Initial Review Date: 2019
Generated: 19   3:34 pm 
Comments
 
DCP- Discharge Planning
 
Updated by [email or web address]: Radhika Pepe on 19   1:55 pm CT
Patient Name: EDUARDO PARKER                                     
Admission Status: Urgent   
Accout number: [id number]                              
Admission Date: 2019   
: 1955                                                        
Admission Diagnosis:   
Attending: HYUN PARKINSON                                                
Current LOS:  2   
  
Anticipated DC Date:    
Planned Disposition: Home Health Service   
Primary Insurance: [email or web address]   
  
  
Discharge Planning Comments:   
  
CM met with patient to complete initial dc planning assessment.  CM educated 
patient on the CM role and verbal consent given by patient to complete 
assessment.   Patient lives at home with his wife where he was independent 
 
with his care.   At discharge patient plans to return home and feels this is 
a safe discharge.  CM discussed availability of home health, rehab services, 
and medical equipment. He is current with [facility]  ( MEGAN signed and 
in chart) He will need iv abx for 3 weeks. He does not care what company to 
use, just needs to take his insurance and the cheapest. Patient has a walker 
at home. He would like either crutches or a wheelchair, because he drags his 
foot. Will speak with MD to see what he wants him to have.  CM will continue 
to follow and will assist as needed with dc plans/needs.    
  
  
  
  
: Radhika Pepe
 DCPIA - Discharge Planning Initial Assessment
 
Updated by [email or web address]: Radhika Pepe on 19   2:51 pm
*  Is the patient Alert and Oriented?
Yes
*  How many steps to enter\exit or inside your home?
 
*  PCP
[facility]
*  [facility]
*  Preadmission Environment
Home with Family
*  ADLs
Independent
*  Equipment
Rolling Walker
*  List name and contact numbers for known caregivers / representatives who 
currently or will assist patient after discharge:
Bing Parker [phone]
*  Verbal permission to speak to the caregivers and representatives has been 
obtained from the patient.
Yes
*  Community resources currently utilized
Home Health
*  Please name any agencies selected above.
[facility]
*  Additional services required to return to the preadmission environment?
Yes
*  Can the patient safely return to the preadmission environment?
Yes
*  Has this patient been hospitalized within the prior 30 days at any 
hospital?
Yes
 
External Providers
External Provider: [facility]
 
Next Contact Date: 
 
Service Request Date: 
Service Type: 
Resolution: 
 
Reviewer: 
Comments: 
External Provider: [facility]
 
Next Contact Date: 
Service Request Date: 
Service Type: 
Resolution: 
 
Reviewer: 
Comments: 
 
 
 
 
Coverage Notice
 
Reviewer: [email or web address] Nissa Pepe
 
Notice Issued Date-Time: 2019  14:20
Notice Type: Patient Choice Letter
 
Notice Delivered To: Patient
Relationship to Patient: 
Representative Name: 
 
Delivery Method: HAND - Hand Delivered
Gege Days:
Prior Verbal Notification: 
 
Recipient Understood Notice: Yes
Recipient Signature: Yes
Med Rec Note Co-signed by Attending:
 
Coverage Notice Comment:  [facility] (current)
 
Last DP export: 19   2:03 pm
Patient Name: EDUARDO PARKER
 
Encounter #: [id number]
Page [id number]
 
 
 
 
 
Electronically Signed by MARRY LIZARRAGA on 19 at 1434
 
 
 
 
 
 
**All edits/amendments must be made on the electronic document**
 
DICTATION DATE: 19     : LONG  19     
RPT#: [id number]                                DC DATE:        
                                               STATUS: ADM IN  
[facility]
[address] [place] [address]
***END OF REPORT***

## 2019-08-12 NOTE — MORECARE
CASE MANAGEMENT DISCHARGE SUMMARY
 
 
PATIENT: EDUARDO PARKER                      UNIT: [id number]
ACCOUNT#: [id number]                       ADM DATE: 19
AGE: 63     : 55  SEX: M            ROOM/BED: D.2203    
AUTHOR: MARRY LIZARRAGA                             PHYSICIAN:                               
 
REFERRING PHYSICIAN: HYUN PARKINSON MD         
DATE OF SERVICE: 19
Discharge Plan
 
 
Patient Name: EDUARDO PARKER
Facility: [facility]:[place]
Encounter #: [id number]
Medical Record #: [id number]
: 1955
Planned Disposition: Home Health Service
Anticipated Discharge Date: 
 
Discharge Date: 2019
Expected LOS: 
Initial Reviewer: [email or web address]
Initial Review Date: 2019
Generated: 19   3:16 pm 
Comments
 
DCP- Discharge Planning
 
Updated by [email or web address]: Radhika Pepe on 19   1:36 pm CT
PATIENT DISCHARGING HOME TODAY WITH ORAL ABX, HE WILL CONTINUE WITH [facility]. CRUTCHES ARE ORDERED FROM [facility] AND THEY WILL 
BE DELIVERED TO THE HOSPITAL. WIFE AT BEDSIDE  AND WILL BE DRIVING THEM HOME
DCP- Discharge Planning
 
Updated by [email or web address]: Radhika Pepe on 19   1:55 pm CT
Patient Name: EDUARDO PARKER                                     
Admission Status: Urgent   
Accout number: [id number]                              
Admission Date: 2019   
: 1955                                                        
Admission Diagnosis:   
Attending: HYUN PARKINSON                                                
Current LOS:  2   
  
Anticipated DC Date:    
Planned Disposition: Home Health Service   
Primary Insurance: [employer]   
  
 
  
Discharge Planning Comments:   
  
CM met with patient to complete initial dc planning assessment.  CM educated 
patient on the CM role and verbal consent given by patient to complete 
assessment.   Patient lives at home with his wife where he was independent 
with his care.   At discharge patient plans to return home and feels this is 
a safe discharge.  CM discussed availability of home health, rehab services, 
and medical equipment. He is current with [facility]  ( MEGAN signed and 
in chart) He will need iv abx for 3 weeks. He does not care what company to 
use, just needs to take his insurance and the cheapest. Patient has a walker 
at home. He would like either crutches or a wheelchair, because he drags his 
foot. Will speak with MD to see what he wants him to have.  CM will continue 
to follow and will assist as needed with dc plans/needs.    
  
  
  
  
: Radhika Pepe
 DCPIA - Discharge Planning Initial Assessment
 
Updated by [email or web address]: Radhika Pepe on 19   2:51 pm
*  Is the patient Alert and Oriented?
Yes
*  How many steps to enter\exit or inside your home?
 
*  PCP
[facility]
*  [facility]
*  Preadmission Environment
Home with Family
*  ADLs
Independent
*  Equipment
Rolling Walker
*  List name and contact numbers for known caregivers / representatives who 
currently or will assist patient after discharge:
Bing Parker [phone]
*  Verbal permission to speak to the caregivers and representatives has been 
obtained from the patient.
Yes
*  Community resources currently utilized
Home Health
*  Please name any agencies selected above.
[facility]
*  Additional services required to return to the preadmission environment?
Yes
*  Can the patient safely return to the preadmission environment?
Yes
*  Has this patient been hospitalized within the prior 30 days at any 
hospital?
 
Yes
 
 
 
 
 
Coverage Notice
 
Reviewer: [email or web address] - Radhika Pepe
 
Notice Issued Date-Time: 2019  14:20
Notice Type: Patient Choice Letter
 
Notice Delivered To: Patient
Relationship to Patient: 
Representative Name: 
 
Delivery Method: HAND - Hand Delivered
Gege Days:
Prior Verbal Notification: 
 
Recipient Understood Notice: Yes
Recipient Signature: Yes
Med Rec Note Co-signed by Attending:
 
Coverage Notice Comment:  [facility] (current)
 
Last DP export: 19   2:48 pm
Patient Name: EDUARDO PARKER
Encounter #: [id number]
Page [phone]
 
 
 
 
 
Electronically Signed by MARRY LIZARRAGA on 19 at 1416
 
 
 
 
 
 
**All edits/amendments must be made on the electronic document**
 
DICTATION DATE: 195     : LONG  19 1415     
RPT#: [id number]                                DC DATE:19
                                               STATUS: DIS IN  
[facility]
[address] [place] [address]
***END OF REPORT***

## 2019-09-11 ENCOUNTER — HOSPITAL ENCOUNTER (OUTPATIENT)
Dept: HOSPITAL 84 - D.LABREF | Age: 64
Discharge: HOME | End: 2019-09-11
Attending: CLINICAL NURSE SPECIALIST
Payer: COMMERCIAL

## 2019-09-11 VITALS — BODY MASS INDEX: 31.6 KG/M2

## 2019-09-11 DIAGNOSIS — L03.90: Primary | ICD-10-CM

## 2019-09-11 LAB
BASOPHILS NFR BLD AUTO: 0.8 % (ref 0–2)
EOSINOPHIL NFR BLD: 2.3 % (ref 0–7)
ERYTHROCYTE [DISTWIDTH] IN BLOOD BY AUTOMATED COUNT: 15.3 % (ref 11.5–14.5)
ERYTHROCYTE [SEDIMENTATION RATE] IN BLOOD: 13 MM/HR (ref 0–20)
HCT VFR BLD CALC: 35.4 % (ref 42–54)
HGB BLD-MCNC: 11.9 G/DL (ref 13.5–17.5)
IMM GRANULOCYTES NFR BLD: 1.2 % (ref 0–5)
LYMPHOCYTES NFR BLD AUTO: 25.3 % (ref 15–50)
MCH RBC QN AUTO: 30.1 PG (ref 26–34)
MCHC RBC AUTO-ENTMCNC: 33.6 G/DL (ref 31–37)
MCV RBC: 89.4 FL (ref 80–100)
MONOCYTES NFR BLD: 10.7 % (ref 2–11)
NEUTROPHILS NFR BLD AUTO: 59.7 % (ref 40–80)
PLATELET # BLD: 248 10X3/UL (ref 130–400)
PMV BLD AUTO: 9.4 FL (ref 7.4–10.4)
RBC # BLD AUTO: 3.96 10X6/UL (ref 4.2–6.1)
WBC # BLD AUTO: 6.5 10X3/UL (ref 4.8–10.8)